# Patient Record
Sex: MALE | Race: AMERICAN INDIAN OR ALASKA NATIVE | NOT HISPANIC OR LATINO | Employment: FULL TIME | URBAN - METROPOLITAN AREA
[De-identification: names, ages, dates, MRNs, and addresses within clinical notes are randomized per-mention and may not be internally consistent; named-entity substitution may affect disease eponyms.]

---

## 2024-03-18 ENCOUNTER — OFFICE VISIT (OUTPATIENT)
Dept: UROLOGY | Facility: CLINIC | Age: 24
End: 2024-03-18
Payer: COMMERCIAL

## 2024-03-18 VITALS
BODY MASS INDEX: 20.44 KG/M2 | HEIGHT: 69 IN | DIASTOLIC BLOOD PRESSURE: 74 MMHG | WEIGHT: 138 LBS | HEART RATE: 77 BPM | SYSTOLIC BLOOD PRESSURE: 124 MMHG

## 2024-03-18 DIAGNOSIS — Z30.09 STERILIZATION CONSULT: Primary | ICD-10-CM

## 2024-03-18 PROCEDURE — 99203 OFFICE O/P NEW LOW 30 MIN: CPT | Performed by: NURSE PRACTITIONER

## 2024-03-18 NOTE — PROGRESS NOTES
"Chief Complaint  Sterilization (VAS. CONSULT)    Subjective          Nayla Nino 23 y.o.   male who presents to [facility] UROLOGY  History of Present Illness  The patient is a consultation from self, for vasectomy counseling. Prior  surgeries have not been performed. Patient is in committed long term relationship with his fiancee and they have 2 biological children together. Dating each other since high school plans of marriage this summer. They do not want more children.     The patient is counseled regarding a no scalpel vasectomy. Key points are that it should be considered irreversible even though it can be reversed, there are risks including failure to achieve sterility, recanalization, bleeding, testicular swelling and/or pain, formation of a sperm granuloma and infection. Limitations of activity post-procedure were discussed and he understands that he is not sterile immediately following the procedure. He will need to bring a semen specimen back in about 6-8 weeks to confirm the abscence of sperm and needs to use contraception until then. Patient understands this is considered an irreversible procedure and wishes have performed. Denies any urological problems or bleeding disorders.     03/18/2024       Review of Systems   Constitutional:  Negative for chills and fever.   Genitourinary: Negative.    Hematological:  Does not bruise/bleed easily.      Objective   Vital Signs:   /74 (BP Location: Left arm, Patient Position: Sitting, Cuff Size: Adult)   Pulse 77   Ht 175.3 cm (69\")   Wt 62.6 kg (138 lb)   BMI 20.38 kg/m²     Physical Exam  Vitals and nursing note reviewed.   Constitutional:       General: He is not in acute distress.     Appearance: Normal appearance. He is well-developed. He is not ill-appearing.      Comments: Ambulates without difficulty   Cardiovascular:      Rate and Rhythm: Normal rate and regular rhythm.   Pulmonary:      Effort: Pulmonary " effort is normal.      Breath sounds: Normal breath sounds and air entry.   Genitourinary:     Testes:         Right: Mass or tenderness not present.         Left: Mass or tenderness not present.      Epididymis:      Right: Not inflamed. No mass or tenderness.      Left: Not inflamed. No mass or tenderness.      Comments: Tiny right hydrocele  Musculoskeletal:         General: Normal range of motion.   Skin:     General: Skin is warm and dry.   Neurological:      Mental Status: He is alert and oriented to person, place, and time.      Motor: Motor function is intact.   Psychiatric:         Mood and Affect: Mood normal.         Behavior: Behavior normal. Behavior is cooperative.         Thought Content: Thought content normal.         Judgment: Judgment normal.        Result Review :                 Assessment and Plan    Diagnoses and all orders for this visit:    1. Sterilization consult (Primary)  -     Vasectomy; Future        Pre-op medication Valium 5mg x1 dose to be taken 30-45 minutes prior to Vasectomy. Medication is not required but is available if desired. Patient to call office several days prior to scheduled procedure for prescription to be sent into  pharmacy. Patient must have .    Pronox nitrous oxide available at elective choice if desired at patient cost.     All risks, benefits and alternatives to vasectomy discussed. Patient understanding that this is considered an irreversible procedure. Written consent obtained. Verbal and written information provided along with a copy of signed instructions.     Patient was given instructions and counseling regarding his condition or for health maintenance advice. Please see specific information pulled into the AVS if appropriate.     Instructions  The patient is to go immediately home and lie down flat on his back with an ice pack on the scrotum as verbally discussed.  He may shower in the morning but no immersion in water for 2 weeks. He is to avoid  strenuous activity for 3 days total including day of procedure and  no straddle activity for 3 weeks. He is reminded that he is not yet sterile and must use contraception until we confirm he no longer has sperm in a semen. Specimen to be brought to office after 26 ejaculations approximately in 6-8 weeks. Patient to call prior to specimen drop off to ensure provider MD Anahi will be in office to check sample. Patient to call office to be seen if any post procedure problems.  The patient will schedule a vasectomy as desired.  Handouts were provided  Electronically Identified Patient Education Materials provided.          Follow Up   Return for vasectomy as scheduled.  SHERITA Lawrence